# Patient Record
Sex: FEMALE | URBAN - METROPOLITAN AREA
[De-identification: names, ages, dates, MRNs, and addresses within clinical notes are randomized per-mention and may not be internally consistent; named-entity substitution may affect disease eponyms.]

---

## 2023-07-17 ENCOUNTER — TELEPHONE (OUTPATIENT)
Dept: OBGYN CLINIC | Age: 23
End: 2023-07-17

## 2023-07-17 NOTE — TELEPHONE ENCOUNTER
Pt called stating she has had on/off brown/pink vaginal spotting x 1 week. Blood type: B Positive. Pt states her period is due to start in 2 days. Pt reports she has had 1 positive and 1 negative urine pregnancy test. Pt instructed to repeat urine pregnancy test in the morning with her first void of the day and call us with results. Bleeding/ectopic precautions reviewed. Pt agree's and voiced understanding.

## 2023-07-18 NOTE — TELEPHONE ENCOUNTER
Pt called and states her UPT was negative today. Pt states she forgot to mention it yesterday but she is taking OCP's but occasionally misses a few doses. Pt encouraged to use back up protection. Pt agree's and voiced understanding. AE scheduled with  on 8/9/23.